# Patient Record
Sex: FEMALE | Race: WHITE
[De-identification: names, ages, dates, MRNs, and addresses within clinical notes are randomized per-mention and may not be internally consistent; named-entity substitution may affect disease eponyms.]

---

## 2017-10-05 ENCOUNTER — HOSPITAL ENCOUNTER (EMERGENCY)
Dept: HOSPITAL 10 - E/R | Age: 62
Discharge: HOME | End: 2017-10-05
Payer: COMMERCIAL

## 2017-10-05 VITALS
HEIGHT: 63 IN | BODY MASS INDEX: 30.16 KG/M2 | WEIGHT: 170.2 LBS | WEIGHT: 170.2 LBS | HEIGHT: 63 IN | BODY MASS INDEX: 30.16 KG/M2

## 2017-10-05 VITALS — RESPIRATION RATE: 17 BRPM | HEART RATE: 56 BPM | DIASTOLIC BLOOD PRESSURE: 75 MMHG | SYSTOLIC BLOOD PRESSURE: 179 MMHG

## 2017-10-05 DIAGNOSIS — I10: ICD-10-CM

## 2017-10-05 DIAGNOSIS — E11.65: ICD-10-CM

## 2017-10-05 DIAGNOSIS — R20.2: Primary | ICD-10-CM

## 2017-10-05 LAB
ANION GAP SERPL CALC-SCNC: 15 MMOL/L (ref 8–16)
BASOPHILS # BLD AUTO: 0.1 10^3/UL (ref 0–0.1)
BASOPHILS NFR BLD: 0.7 % (ref 0–2)
BUN SERPL-MCNC: 17 MG/DL (ref 7–20)
CALCIUM SERPL-MCNC: 9.5 MG/DL (ref 8.4–10.2)
CHLORIDE SERPL-SCNC: 103 MMOL/L (ref 97–110)
CO2 SERPL-SCNC: 25 MMOL/L (ref 21–31)
CREAT SERPL-MCNC: 0.7 MG/DL (ref 0.44–1)
EOSINOPHIL # BLD: 0.3 10^3/UL (ref 0–0.5)
EOSINOPHIL NFR BLD: 4.1 % (ref 0–7)
ERYTHROCYTE [DISTWIDTH] IN BLOOD BY AUTOMATED COUNT: 13.2 % (ref 11.5–14.5)
GLUCOSE SERPL-MCNC: 284 MG/DL (ref 70–220)
HCT VFR BLD CALC: 45.6 % (ref 37–47)
HGB BLD-MCNC: 16 G/DL (ref 12–16)
LYMPHOCYTES # BLD AUTO: 1.8 10^3/UL (ref 0.8–2.9)
LYMPHOCYTES NFR BLD AUTO: 22.1 % (ref 15–51)
MCH RBC QN AUTO: 30.9 PG (ref 29–33)
MCHC RBC AUTO-ENTMCNC: 35.1 G/DL (ref 32–37)
MCV RBC AUTO: 88 FL (ref 82–101)
MONOCYTES # BLD: 0.6 10^3/UL (ref 0.3–0.9)
MONOCYTES NFR BLD: 7.9 % (ref 0–11)
NEUTROPHILS # BLD: 5.3 10^3/UL (ref 1.6–7.5)
NEUTROPHILS NFR BLD AUTO: 64.8 % (ref 39–77)
NRBC # BLD MANUAL: 0 10^3/UL (ref 0–0)
NRBC BLD AUTO-RTO: 0 /100WBC (ref 0–0)
PLATELET # BLD: 233 10^3/UL (ref 140–415)
PMV BLD AUTO: 11.7 FL (ref 7.4–10.4)
POTASSIUM SERPL-SCNC: 3.9 MMOL/L (ref 3.5–5.1)
RBC # BLD AUTO: 5.18 10^6/UL (ref 4.2–5.4)
SODIUM SERPL-SCNC: 139 MMOL/L (ref 135–144)
TROPONIN I SERPL-MCNC: < 0.012 NG/ML (ref 0–0.12)
WBC # BLD AUTO: 8.1 10^3/UL (ref 4.8–10.8)

## 2017-10-05 PROCEDURE — 85025 COMPLETE CBC W/AUTO DIFF WBC: CPT

## 2017-10-05 PROCEDURE — 84484 ASSAY OF TROPONIN QUANT: CPT

## 2017-10-05 PROCEDURE — 36415 COLL VENOUS BLD VENIPUNCTURE: CPT

## 2017-10-05 PROCEDURE — 80048 BASIC METABOLIC PNL TOTAL CA: CPT

## 2017-10-05 PROCEDURE — 71010: CPT

## 2017-10-05 PROCEDURE — 93005 ELECTROCARDIOGRAM TRACING: CPT

## 2017-10-05 NOTE — RADRPT
PROCEDURE:   XR Chest.

 

CLINICAL INDICATION:   Chest pain .

 

TECHNIQUE:   Single frontal chest x-ray. 

 

COMPARISON:   None.

 

FINDINGS:

The lungs are clear of acute infiltrates, edema, effusions, or masses. Calcific atherosclerosis of t
he aorta is present..  The cardiomediastinal silhouette is unremarkable. Degenerative disk changes o
f the spine are present.

 

IMPRESSION:

No acute cardiopulmonary disease.   

 

RPTAT: GG

_____________________________________________ 

.Scot Anne MD, MD           Date    Time 

Electronically viewed and signed by .Scot Anne MD, MD on 10/05/2017 11:04 

 

D:  10/05/2017 11:04  T:  10/05/2017 11:04

.L/

## 2017-10-05 NOTE — ERD
ER Documentation


Chief Complaint


Date/Time


DATE: 10/5/17 


TIME: 11:55


Chief Complaint


left side numbness





HPI


This is a 62-year-old female with a history of hypertension who presents to the 

emergency room for evaluation of numbness and tingling which is periodic and 

occurs on the left side of her body.  The patient denies any active numbness or 

tingling at this time.  She denies any chest pain or shortness of breath.  The 

patient came to the emergency room today for evaluation and is denying any 

aggravating or relieving factors for her symptoms.





ROS


All systems reviewed and are negative except as per history of present illness.





Medications


Home Meds


Reported Medications


Ibuprofen* (Advil*) 200 Mg Capsule, 200 MG PRN


   6/18/14





Allergies


Allergies:  


Coded Allergies:  


     No Known Allergy (Unverified , 6/18/14)





PMhx/Soc


History of Surgery:  No


Anesthesia Reaction:  No


Hx Neurological Disorder:  No


Hx Respiratory Disorders:  No


Hx Cardiac Disorders:  Yes (HTN)


Hx Psychiatric Problems:  No


Hx Miscellaneous Medical Probl:  Yes (DM)


Hx Alcohol Use:  No


Hx Substance Use:  No


Hx Tobacco Use:  No


Smoking Status:  Never smoker





Physical Exam


Vitals





Vital Signs








  Date Time  Temp Pulse Resp B/P Pulse Ox O2 Delivery O2 Flow Rate FiO2


 


10/5/17 10:15 98.6 67 18 168/85 99   








Physical Exam


INITIAL VITAL SIGNS: Reviewed by me


GENERAL:  The patient is well developed and appropriate for usual state of 

health in no apparent distress


HEENT: Pupils equal, round, and reactive to light.  EOMI. There is no scleral 

icterus.


NECK:  C-spine is soft and supple, there is no meningismus.  There is no 

cervical lymphadenopathy.


LUNGS:  Clear to auscultation bilaterally. There are no rales, wheezes or 

rhonchi.


HEART:  Regular rate and rhythm, no murmurs, clicks, rubs or gallops.


ABDOMEN:  Soft, non-tender, non-distended.  There are bowel sounds in all four 

quadrants. No rebound or guarding.


EXTREMITIES:  There is no peripheral cyanosis or edema.  No focal swelling or 

erythema.


NEUROLOGICAL:  The patient moves all four extremities with 5/5 strength.  

Cranial nerves II - XII are intact. Normal gait. Alert and oriented


SKIN:  There is no apparent rash or petechiae.


HEME/LYMPHATIC:  There is no evidence of excessive bruising or lymphedema.


PSYCHIATRIC:  The patient does not appear anxious or depressed.


Result Diagram:  


10/5/17 1040                                                                   

             10/5/17 1040





Results 24 hrs





 Laboratory Tests








Test


  10/5/17


10:40


 


White Blood Count 8.110^3/ul 


 


Red Blood Count 5.1810^6/ul 


 


Hemoglobin 16.0g/dl 


 


Hematocrit 45.6% 


 


Mean Corpuscular Volume 88.0fl 


 


Mean Corpuscular Hemoglobin 30.9pg 


 


Mean Corpuscular Hemoglobin


Concent 35.1g/dl 


 


 


Red Cell Distribution Width 13.2% 


 


Platelet Count 25433^3/UL 


 


Mean Platelet Volume 11.7fl 


 


Neutrophils % 64.8% 


 


Lymphocytes % 22.1% 


 


Monocytes % 7.9% 


 


Eosinophils % 4.1% 


 


Basophils % 0.7% 


 


Nucleated Red Blood Cells % 0.0/100WBC 


 


Neutrophils # 5.310^3/ul 


 


Lymphocytes # 1.810^3/ul 


 


Monocytes # 0.610^3/ul 


 


Eosinophils # 0.310^3/ul 


 


Basophils # 0.110^3/ul 


 


Nucleated Red Blood Cells # 0.010^3/ul 


 


Sodium Level 139mmol/L 


 


Potassium Level 3.9mmol/L 


 


Chloride Level 103mmol/L 


 


Carbon Dioxide Level 25mmol/L 


 


Anion Gap 15 


 


Blood Urea Nitrogen 17mg/dl 


 


Creatinine 0.70mg/dl 


 


Glucose Level 284mg/dl 


 


Calcium Level 9.5mg/dl 


 


Troponin I < 0.012ng/ml 











Procedures/[facility]


EKG: 


Rate/Rhythm:             [Normal Sinus Rhythm]


QRS, ST, T-waves:    [No changes consistent w/ acute ischemia]


Impression:      [No evidence of ischemia or arrhythmia]


Chest X-ray 1V Interpreted by me:


Soft Tissue:                                               No acute 

abnormalities


Bones:                                                    No acute abnormalities


Mediastinum/Cardiac Silhouette/Lungs:     [No acute abnormalities]





This 62-year-old female presents to the emergency room for evaluation of 

paresthesias in the upper and lower extremity on the left.  When I evaluated 

this patient she was nontoxic-appearing, with no focal neurological deficits.  

This patient does have a history of hypertension and had a cardiac workup in 

the emergency room which is negative including a EKG and troponin.  This 

patient has no active numbness or tingling at this time however her blood sugar 

was slightly elevated 284.  I advised her that her blood sugar was elevated and 

she stated that she does not have a history of diabetes.  There could be 

component of neuropathy to this given the fact that she is not being treated 

for diabetes and could have long-standing underlying diabetes.  I advised 

patient to follow-up with her primary care physician for checking of her blood 

glucoses she said that she is comfortable with the plan.  The patient will be 

discharged at this time structures to return to the ER at any moment for 

evaluation.





Departure


Diagnosis:  


 Primary Impression:  


 Paresthesias


 Additional Impression:  


 Hyperglycemia


Condition:  Stable











LACIE VILLAGOMEZ DO Oct 5, 2017 11:58

## 2017-11-11 ENCOUNTER — HOSPITAL ENCOUNTER (EMERGENCY)
Dept: HOSPITAL 10 - FTE | Age: 62
Discharge: HOME | End: 2017-11-11
Payer: COMMERCIAL

## 2017-11-11 VITALS — HEART RATE: 84 BPM | DIASTOLIC BLOOD PRESSURE: 67 MMHG | SYSTOLIC BLOOD PRESSURE: 140 MMHG

## 2017-11-11 VITALS
BODY MASS INDEX: 30.43 KG/M2 | WEIGHT: 165.35 LBS | BODY MASS INDEX: 30.43 KG/M2 | HEIGHT: 62 IN | WEIGHT: 165.35 LBS | HEIGHT: 62 IN

## 2017-11-11 DIAGNOSIS — E11.9: ICD-10-CM

## 2017-11-11 DIAGNOSIS — Z79.84: ICD-10-CM

## 2017-11-11 DIAGNOSIS — I10: ICD-10-CM

## 2017-11-11 DIAGNOSIS — Z76.0: Primary | ICD-10-CM

## 2017-11-11 PROCEDURE — 99283 EMERGENCY DEPT VISIT LOW MDM: CPT

## 2017-11-11 PROCEDURE — 82962 GLUCOSE BLOOD TEST: CPT

## 2017-11-11 NOTE — ERD
ER Documentation


Chief Complaint


Chief Complaint


Patient is here for a medication refill metformin





HPI


This is a 62-year-old female presenting to emerge department for medication 

refill.  Patient states she tried to contact her doctor's office yesterday and 

was told she cannot see a physician  for 2 days.  Patient states she is out of 

her blood pressure and diabetes medication.  Patient takes metformin and 

losartan.  Patient also states she takes alprazolam as needed.  Patient checks 

her blood glucose at home and last check was 148 this morning.  No chest pain, 

shortness of breath or difficulty breathing.  No headache.





ROS


All systems reviewed and are negative except as per history of present illness.





Medications


Home Meds


Active Scripts


Alprazolam* (Alprazolam*) 0.25 Mg Tablet, 0.25 MG PO Q8, #5 TAB


   Prov:YI BARTON NP         11/11/17


Losartan Potassium* (Losartan Potassium*) 50 Mg Tablet, 50 MG PO DAILY, #10 TAB


   Prov:YI BARTON NP         11/11/17


Metformin* (Glucophage*) 500 Mg Tab, 500 MG PO BID, #20 TAB


   Prov:YI BARTON NP         11/11/17


Reported Medications


Ibuprofen* (Advil*) 200 Mg Capsule, 200 MG PRN


   6/18/14





Allergies


Allergies:  


Coded Allergies:  


     No Known Allergy (Unverified , 6/18/14)





PMhx/Soc


History of Surgery:  Yes (HYSTERECTOMY, BILATERAL CYST REMOVAL FROM BREAST)


Anesthesia Reaction:  No


Hx Neurological Disorder:  No


Hx Respiratory Disorders:  No


Hx Cardiac Disorders:  Yes (HTN)


Hx Psychiatric Problems:  Yes (Anxiety)


Hx Miscellaneous Medical Probl:  Yes (DM)


Hx Alcohol Use:  No


Hx Substance Use:  No


Hx Tobacco Use:  No


Smoking Status:  Never smoker





Physical Exam


Vitals





Vital Signs








  Date Time  Temp Pulse Resp B/P Pulse Ox O2 Delivery O2 Flow Rate FiO2


 


11/11/17 10:40  84  140/67    


 


11/11/17 08:54 97.7 54 20 161/73 100   








Physical Exam


Const:               No acute distress, alert


Head:   Atraumatic 


Eyes:    Normal Conjunctiva


ENT:    Normal External Ears, Nose and Mouth.


Neck:               Full range of motion..~ No meningismus.


Resp:    Clear to auscultation bilaterally.  No wheezing, rhonchi or crackles.


Cardio:    Regular rate and rhythm, no murmurs


Abd:    Soft, non tender, non distended. Normal bowel sounds


Skin:    No petechiae or rashes


Back:    No midline or flank tenderness


Ext:    No cyanosis, or edema


Neur:    Awake and alert


Psych:    Normal Mood and Affect


Results 24 hrs





 Laboratory Tests








Test


  11/11/17


09:42


 


Bedside Glucose 115mg/dL 








 Current Medications








 Medications


  (Trade)  Dose


 Ordered  Sig/Mónica


 Route


 PRN Reason  Start Time


 Stop Time Status Last Admin


Dose Admin


 


 Losartan Potassium


  (Cozaar)  50 mg  ONCE  ONCE


 PO


   11/11/17 09:30


 11/11/17 09:31 DC 11/11/17 09:44


 











Procedures/MDM


MDM: This is a 62-year-old female presenting to emerge department for 

medication refill of metformin, losartan and alprazolam.  Patient's blood 

glucose checked here while in the ED and blood glucose is 115.  Patient's blood 

pressure on initial exam is 161/73mm Hg and patient given her normal dose of 

losartan 50 mg.  Upon reassessment, blood pressure is normal.  Patient denies 

chest pain, shortness of breath or difficulty breathing.  No dizziness or 

lightheadedness.  No headache.





Low suspicion for hyperglycemia or DKA.  Low suspicion for malignant 

hypertension. 





Patient is appropriate for outpatient management and will be given prescription 

for metformin 500 mg, losartan 50 mg and alprazolam 0.25 mg #5.  Patient is 

instructed to follow-up with primary care provider in the next 2-3 days for 

reassessment and additional management.  Return to ED for any high fever, chest 

pain, difficulty breathing, shortness breath, wheezing, vomiting, diarrhea, 

abdominal pain or any new or worsening symptoms. Patient verbalizes 

understanding. All questions answered at discharge.





Disclaimer: Inadvertent spelling and grammatical errors are likely due to EHR/

dictation software use and do not reflect on the overall quality of patient 

care. Also, please note that the electronic time recorded on this note does not 

necessarily reflect the actual time of the patient encounter.





Departure


Diagnosis:  


 Primary Impression:  


 Encounter for medication refill


Condition:  Stable


Patient Instructions:  Taking Medicine Safely


Referrals:  


[facility]


YOU HAVE RECEIVED A MEDICAL SCREENING EXAM AND THE RESULTS INDICATE THAT YOU DO 

NOT HAVE A CONDITION THAT REQUIRES URGENT TREATMENT IN THE EMERGENCY DEPARTMENT.





FURTHER EVALUATION AND TREATMENT OF YOUR CONDITION CAN WAIT UNTIL YOU ARE SEEN 

IN YOUR DOCTORS OFFICE WITHIN THE NEXT 1-2 DAYS. IT IS YOUR RESPONSIBILITY TO 

MAKE AN APPOINTMENT FOR FOLOW-UP CARE.





IF YOU HAVE A PRIMARY DOCTOR


--you should call your primary doctor and schedule an appointment





IF YOU DO NOT HAVE A PRIMARY DOCTOR YOU CAN CALL OUR PHYSICIAN REFERRAL HOTLINE 

AT


 [phone] 





IF YOU CAN NOT AFFORD TO SEE A PHYSICIAN YOU CAN CHOSE FROM THE FOLLOWING 

[facility] [phone] [facility]





[facility] [phone] [facility]





[facility] [phone] [address] [facility] [phone] [facility] [phone] [address] [facility]. [facility] [phone] [address] [facility]. [facility]


YOU HAVE RECEIVED A MEDICAL SCREENING EXAM AND THE RESULTS INDICATE THAT YOU DO 

NOT HAVE A CONDITION THAT REQUIRES URGENT TREATMENT IN THE EMERGENCY DEPARTMENT.





FURTHER EVALUATION AND TREATMENT OF YOUR CONDITION CAN WAIT UNTIL YOU ARE SEEN 

IN YOUR DOCTORS OFFICE WITHIN THE NEXT 1-2 DAYS. IT IS YOUR RESPONSIBILITY TO 

MAKE AN APPOINTMENT FOR FOLOW-UP CARE.





IF YOU HAVE A PRIMARY DOCTOR


--you should call your primary doctor and schedule and appointment





IF YOU DO NOT HAVE A PRIMARY DOCTOR YOU CAN CALL OUR PHYSICIAN REFERRAL HOTLINE 

AT [phone].





IF YOU CAN NOT AFFORD TO SEE A PHYSICIAN YOU CAN CHOSE FROM THE FOLLOWING 

[facility]:





[facility]


[address] [place] [address]





[facility]


[address] [place] [address]





[facility]


[address] [place] [address]





Additional Instructions:  


Call your primary care doctor TOMORROW for an appointment during the next 2-3 

days.See the doctor sooner or return here if your condition worsens before your 

appointment time.





Return to ED for any high fever, chest pain, difficulty breathing, shortness 

breath, wheezing, vomiting, diarrhea, abdominal pain or any new or worsening 

symptoms.











YI BARTON NP Nov 11, 2017 11:17

## 2018-11-08 ENCOUNTER — HOSPITAL ENCOUNTER (EMERGENCY)
Age: 63
LOS: 1 days | Discharge: HOME | End: 2018-11-09

## 2018-11-08 ENCOUNTER — HOSPITAL ENCOUNTER (EMERGENCY)
Dept: HOSPITAL 91 - E/R | Age: 63
LOS: 1 days | Discharge: HOME | End: 2018-11-09
Payer: COMMERCIAL

## 2018-11-08 DIAGNOSIS — I10: ICD-10-CM

## 2018-11-08 DIAGNOSIS — E11.9: ICD-10-CM

## 2018-11-08 DIAGNOSIS — M26.602: Primary | ICD-10-CM

## 2018-11-08 DIAGNOSIS — Z79.84: ICD-10-CM

## 2018-11-08 PROCEDURE — 99282 EMERGENCY DEPT VISIT SF MDM: CPT

## 2018-11-09 RX ADMIN — IBUPROFEN 1 MG: 600 TABLET ORAL at 00:54

## 2019-01-11 ENCOUNTER — HOSPITAL ENCOUNTER (OUTPATIENT)
Dept: HOSPITAL 10 - GIL | Age: 64
Discharge: HOME | End: 2019-01-11
Attending: INTERNAL MEDICINE
Payer: COMMERCIAL

## 2019-01-11 ENCOUNTER — HOSPITAL ENCOUNTER (OUTPATIENT)
Dept: HOSPITAL 91 - GIL | Age: 64
Discharge: HOME | End: 2019-01-11
Payer: COMMERCIAL

## 2019-01-11 VITALS
WEIGHT: 160.5 LBS | BODY MASS INDEX: 29.53 KG/M2 | WEIGHT: 160.5 LBS | HEIGHT: 62 IN | HEIGHT: 62 IN | BODY MASS INDEX: 29.53 KG/M2

## 2019-01-11 VITALS — HEART RATE: 62 BPM | SYSTOLIC BLOOD PRESSURE: 148 MMHG | DIASTOLIC BLOOD PRESSURE: 68 MMHG | RESPIRATION RATE: 18 BRPM

## 2019-01-11 VITALS — DIASTOLIC BLOOD PRESSURE: 70 MMHG | RESPIRATION RATE: 14 BRPM | SYSTOLIC BLOOD PRESSURE: 143 MMHG | HEART RATE: 60 BPM

## 2019-01-11 DIAGNOSIS — E11.9: ICD-10-CM

## 2019-01-11 DIAGNOSIS — Z12.11: Primary | ICD-10-CM

## 2019-01-11 PROCEDURE — 45378 DIAGNOSTIC COLONOSCOPY: CPT
